# Patient Record
Sex: FEMALE | Race: OTHER | NOT HISPANIC OR LATINO | URBAN - METROPOLITAN AREA
[De-identification: names, ages, dates, MRNs, and addresses within clinical notes are randomized per-mention and may not be internally consistent; named-entity substitution may affect disease eponyms.]

---

## 2023-03-08 ENCOUNTER — EMERGENCY (EMERGENCY)
Facility: HOSPITAL | Age: 38
LOS: 1 days | Discharge: LEFT BEFORE DISCHARGE | End: 2023-03-08
Attending: EMERGENCY MEDICINE
Payer: MEDICAID

## 2023-03-08 VITALS
OXYGEN SATURATION: 98 % | RESPIRATION RATE: 19 BRPM | HEART RATE: 76 BPM | SYSTOLIC BLOOD PRESSURE: 115 MMHG | DIASTOLIC BLOOD PRESSURE: 81 MMHG | TEMPERATURE: 98 F | WEIGHT: 149.91 LBS | HEIGHT: 64 IN

## 2023-03-08 LAB
ALBUMIN SERPL ELPH-MCNC: 3.7 G/DL — SIGNIFICANT CHANGE UP (ref 3.3–5)
ALP SERPL-CCNC: 57 U/L — SIGNIFICANT CHANGE UP (ref 40–120)
ALT FLD-CCNC: 28 U/L — SIGNIFICANT CHANGE UP (ref 10–45)
ANION GAP SERPL CALC-SCNC: 8 MMOL/L — SIGNIFICANT CHANGE UP (ref 5–17)
APPEARANCE UR: CLEAR — SIGNIFICANT CHANGE UP
AST SERPL-CCNC: 22 U/L — SIGNIFICANT CHANGE UP (ref 10–40)
BACTERIA # UR AUTO: NEGATIVE — SIGNIFICANT CHANGE UP
BASE EXCESS BLDV CALC-SCNC: 1.3 MMOL/L — SIGNIFICANT CHANGE UP (ref -2–3)
BASOPHILS # BLD AUTO: 0.03 K/UL — SIGNIFICANT CHANGE UP (ref 0–0.2)
BASOPHILS NFR BLD AUTO: 0.4 % — SIGNIFICANT CHANGE UP (ref 0–2)
BILIRUB SERPL-MCNC: 0.1 MG/DL — LOW (ref 0.2–1.2)
BILIRUB UR-MCNC: NEGATIVE — SIGNIFICANT CHANGE UP
BUN SERPL-MCNC: 11 MG/DL — SIGNIFICANT CHANGE UP (ref 7–23)
CA-I SERPL-SCNC: 1.29 MMOL/L — SIGNIFICANT CHANGE UP (ref 1.15–1.33)
CALCIUM SERPL-MCNC: 9.1 MG/DL — SIGNIFICANT CHANGE UP (ref 8.4–10.5)
CHLORIDE BLDV-SCNC: 107 MMOL/L — SIGNIFICANT CHANGE UP (ref 96–108)
CHLORIDE SERPL-SCNC: 107 MMOL/L — SIGNIFICANT CHANGE UP (ref 96–108)
CO2 BLDV-SCNC: 28 MMOL/L — HIGH (ref 22–26)
CO2 SERPL-SCNC: 24 MMOL/L — SIGNIFICANT CHANGE UP (ref 22–31)
COLOR SPEC: SIGNIFICANT CHANGE UP
CREAT SERPL-MCNC: 0.61 MG/DL — SIGNIFICANT CHANGE UP (ref 0.5–1.3)
DIFF PNL FLD: ABNORMAL
EGFR: 118 ML/MIN/1.73M2 — SIGNIFICANT CHANGE UP
EOSINOPHIL # BLD AUTO: 0.42 K/UL — SIGNIFICANT CHANGE UP (ref 0–0.5)
EOSINOPHIL NFR BLD AUTO: 5.7 % — SIGNIFICANT CHANGE UP (ref 0–6)
EPI CELLS # UR: 2 /HPF — SIGNIFICANT CHANGE UP
GAS PNL BLDV: 139 MMOL/L — SIGNIFICANT CHANGE UP (ref 136–145)
GAS PNL BLDV: SIGNIFICANT CHANGE UP
GAS PNL BLDV: SIGNIFICANT CHANGE UP
GLUCOSE BLDV-MCNC: 102 MG/DL — HIGH (ref 70–99)
GLUCOSE SERPL-MCNC: 106 MG/DL — HIGH (ref 70–99)
GLUCOSE UR QL: NEGATIVE — SIGNIFICANT CHANGE UP
HCG SERPL-ACNC: <2 MIU/ML — SIGNIFICANT CHANGE UP
HCG UR QL: NEGATIVE — SIGNIFICANT CHANGE UP
HCO3 BLDV-SCNC: 27 MMOL/L — SIGNIFICANT CHANGE UP (ref 22–29)
HCT VFR BLD CALC: 36 % — SIGNIFICANT CHANGE UP (ref 34.5–45)
HCT VFR BLDA CALC: 37 % — SIGNIFICANT CHANGE UP (ref 34.5–46.5)
HGB BLD CALC-MCNC: 12.2 G/DL — SIGNIFICANT CHANGE UP (ref 11.7–16.1)
HGB BLD-MCNC: 11.9 G/DL — SIGNIFICANT CHANGE UP (ref 11.5–15.5)
HOROWITZ INDEX BLDV+IHG-RTO: SIGNIFICANT CHANGE UP
HYALINE CASTS # UR AUTO: 1 /LPF — SIGNIFICANT CHANGE UP (ref 0–2)
IMM GRANULOCYTES NFR BLD AUTO: 0.3 % — SIGNIFICANT CHANGE UP (ref 0–0.9)
KETONES UR-MCNC: NEGATIVE — SIGNIFICANT CHANGE UP
LACTATE BLDV-MCNC: 1.2 MMOL/L — SIGNIFICANT CHANGE UP (ref 0.5–2)
LEUKOCYTE ESTERASE UR-ACNC: ABNORMAL
LIDOCAIN IGE QN: 40 U/L — SIGNIFICANT CHANGE UP (ref 7–60)
LYMPHOCYTES # BLD AUTO: 3.01 K/UL — SIGNIFICANT CHANGE UP (ref 1–3.3)
LYMPHOCYTES # BLD AUTO: 40.5 % — SIGNIFICANT CHANGE UP (ref 13–44)
MCHC RBC-ENTMCNC: 28.3 PG — SIGNIFICANT CHANGE UP (ref 27–34)
MCHC RBC-ENTMCNC: 33.1 GM/DL — SIGNIFICANT CHANGE UP (ref 32–36)
MCV RBC AUTO: 85.7 FL — SIGNIFICANT CHANGE UP (ref 80–100)
MONOCYTES # BLD AUTO: 0.5 K/UL — SIGNIFICANT CHANGE UP (ref 0–0.9)
MONOCYTES NFR BLD AUTO: 6.7 % — SIGNIFICANT CHANGE UP (ref 2–14)
NEUTROPHILS # BLD AUTO: 3.45 K/UL — SIGNIFICANT CHANGE UP (ref 1.8–7.4)
NEUTROPHILS NFR BLD AUTO: 46.4 % — SIGNIFICANT CHANGE UP (ref 43–77)
NITRITE UR-MCNC: NEGATIVE — SIGNIFICANT CHANGE UP
NRBC # BLD: 0 /100 WBCS — SIGNIFICANT CHANGE UP (ref 0–0)
PCO2 BLDV: 44 MMHG — HIGH (ref 39–42)
PH BLDV: 7.39 — SIGNIFICANT CHANGE UP (ref 7.32–7.43)
PH UR: 7.5 — SIGNIFICANT CHANGE UP (ref 5–8)
PLATELET # BLD AUTO: 265 K/UL — SIGNIFICANT CHANGE UP (ref 150–400)
PO2 BLDV: 23 MMHG — LOW (ref 25–45)
POTASSIUM BLDV-SCNC: 3.8 MMOL/L — SIGNIFICANT CHANGE UP (ref 3.5–5.1)
POTASSIUM SERPL-MCNC: 3.9 MMOL/L — SIGNIFICANT CHANGE UP (ref 3.5–5.3)
POTASSIUM SERPL-SCNC: 3.9 MMOL/L — SIGNIFICANT CHANGE UP (ref 3.5–5.3)
PROT SERPL-MCNC: 6.6 G/DL — SIGNIFICANT CHANGE UP (ref 6–8.3)
PROT UR-MCNC: NEGATIVE — SIGNIFICANT CHANGE UP
RBC # BLD: 4.2 M/UL — SIGNIFICANT CHANGE UP (ref 3.8–5.2)
RBC # FLD: 13.2 % — SIGNIFICANT CHANGE UP (ref 10.3–14.5)
RBC CASTS # UR COMP ASSIST: 2 /HPF — SIGNIFICANT CHANGE UP (ref 0–4)
SAO2 % BLDV: 31.5 % — LOW (ref 67–88)
SODIUM SERPL-SCNC: 139 MMOL/L — SIGNIFICANT CHANGE UP (ref 135–145)
SP GR SPEC: 1.01 — SIGNIFICANT CHANGE UP (ref 1.01–1.02)
UROBILINOGEN FLD QL: NEGATIVE — SIGNIFICANT CHANGE UP
WBC # BLD: 7.43 K/UL — SIGNIFICANT CHANGE UP (ref 3.8–10.5)
WBC # FLD AUTO: 7.43 K/UL — SIGNIFICANT CHANGE UP (ref 3.8–10.5)
WBC UR QL: 6 /HPF — HIGH (ref 0–5)

## 2023-03-08 PROCEDURE — 99284 EMERGENCY DEPT VISIT MOD MDM: CPT

## 2023-03-08 PROCEDURE — 74177 CT ABD & PELVIS W/CONTRAST: CPT | Mod: 26,MA

## 2023-03-08 RX ORDER — SODIUM CHLORIDE 9 MG/ML
1000 INJECTION, SOLUTION INTRAVENOUS ONCE
Refills: 0 | Status: COMPLETED | OUTPATIENT
Start: 2023-03-08 | End: 2023-03-08

## 2023-03-08 RX ADMIN — SODIUM CHLORIDE 1000 MILLILITER(S): 9 INJECTION, SOLUTION INTRAVENOUS at 23:15

## 2023-03-08 NOTE — ED PROVIDER NOTE - PATIENT PORTAL LINK FT
You can access the FollowMyHealth Patient Portal offered by [facility] by registering at the following website: [email or web address]. By joining [facility]’s FollowMyHealth portal, you will also be able to view your health information using other applications (apps) compatible with our system.

## 2023-03-08 NOTE — ED PROVIDER NOTE - LANGUAGE ASSISTANCE NEEDED
Message  I'm having a good week  Not much has changed in regards to my tube feeds and oral intake  I think I'm kind of maxed out with tube feeds  I tried increasing and it just leaked around my Tube  I'm infusing 900mL daily and haven't needed anymore than that  I'm going to try to wean the IV fluids like we did with my kids  We weaned 100-150 ml per week as tolerated  When we got down to about 400 we started every other day and weaned from there  We will see how it goes  Reviewed patient's lab results- prealbumin dropped to 19 7 but still normal  Phosphorous dropped to 2 7 from 3 4  Emailed patient and provided with list of high phosphorous foods to try increase her level  May need to order K-phos if her levels continue to drop or don't respond to po intake      Active Problems    1  Abdominal discomfort (789 00) (R10 9)   2  Abdominal pain (789 00) (R10 9)   3  Abnormal weight gain (783 1) (R63 5)   4  Acute UTI (599 0) (N39 0)   5  Allergic contact dermatitis due to other agents (692 89) (L23 89)   6  Cervical cancer screening (V76 2) (Z12 4)   7  Chronic venous embolism and thrombosis of deep vessels of distal lower extremity   (453 52) (I82 5Z9)   8  Constipation (564 00) (K59 00)   9  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)   10  Dysphagia (787 20) (R13 10)   11  Edema (782 3) (R60 9)   12  Gastroesophageal reflux disease (530 81) (K21 9)   13  History of Nissen fundoplication (P97 45) (A13 48)   14  Hypertension (401 9) (I10)   15  Iron deficiency (280 9) (E61 1)   16  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)   17  Migraine headache (346 90) (G43 909)   18  Muscle weakness (generalized) (728 87) (M62 81)   19  Nausea (787 02) (R11 0)   20  Nausea with vomiting (787 01) (R11 2)   21  Need for diphtheria-tetanus-pertussis (Tdap) vaccine (V06 1) (Z23)   22  Obesity (278 00) (E66 9)   23  Oropharyngeal dysphagia (787 22) (R13 12)   24   Polycystic ovarian syndrome (256  4) (E28 2)   25  Postgastrectomy malabsorption (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) (K91 2,Z90 3)   26  Pre-op evaluation (V72 84) (Z01 818)   27  Pre-operative cardiovascular examination (V72 81) (Z01 810)   28  Retinitis pigmentosa (362 74) (H35 52)   29  Status post gastric bypass for obesity (V45 86) (Z98 84)   30  Stroke Syndrome (436)   31  Type B influenza (487 1) (J10 1)   32  Urination pain (788 1) (R30 9)    Current Meds   1  Amoxicillin-Pot Clavulanate 400-57 MG/5ML Oral Suspension Reconstituted; TAKE 5 ML   Every 6 hours As Directed TDD:20;   Therapy: 56RGO4846 to (Evaluate:17Aug2016)  Requested for: 44LQX0665; Last   Rx:19May2016 Ordered   2  B Complex CAPS; Therapy: (Recorded:71Qcj6845) to Recorded   3  Culturelle CAPS; Therapy: (Recorded:08Qlf6487) to Recorded   4  MiraLax Oral Powder (Polyethylene Glycol 3350); Therapy: (Recorded:75Pug6541) to Recorded   5  Multivitamins TABS; TAKE 1 TABLET DAILY; Therapy: (Recorded:08Vqk8988) to Recorded   6  Ondansetron 4 MG Oral Tablet Dispersible (Zofran ODT); Take one every 4-6 hours prn   for nausea; Therapy: 35DBA2156 to (Last 72 954 091)  Requested for: 23Nov2015 Ordered   7  Pantoprazole Sodium 40 MG Oral Tablet Delayed Release (Protonix); TAKE 1 TABLET   TWICE DAILY 30 MINUTES BEFORE BREAKFAST AND DINNER; Therapy: 17FER1435 to (Korey Riding)  Requested for: 24TYA4493; Last   Rx:10Mar2016 Ordered   8  Stool Softener TABS; Therapy: (Recorded:54Dyt3811) to Recorded    Allergies    1  Guaifenesin & Derivatives   2   Sulfa Drugs    Signatures   Electronically signed by : ANGELA Amato; Aug 23 2016  2:48PM EST                       (Author) No-Patient/Caregiver offered and refused free interpretation services.

## 2023-03-08 NOTE — ED PROVIDER NOTE - NS ED ROS FT
CONST: no fevers, no chills  ENT: no sore throat  CV: no chest pain, no leg swelling  RESP: no shortness of breath, no cough  ABD: +abdominal pain, no nausea, no vomiting, no diarrhea  : no current dysuria, no current flank pain, no hematuria  MSK: no back pain, no extremity pain  SKIN:  no rash

## 2023-03-08 NOTE — ED PROVIDER NOTE - PHYSICAL EXAMINATION
Physical Exam:  Gen: awake alert   HEENT: normal conjunctiva, oral mucosa moist  Lung: CTAB, no respiratory distress, no wheezes/rhonchi/rales B/L, speaking in full sentences  CV: RRR  Abd: soft, LLQ/suprapubic TTP, ND, no guarding, no rigidity, no rebound tenderness, Mild L CVA tenderness   MSK: no visible deformities  Skin: Warm, well perfused, no rash, no leg swelling  ~Robert Pittman MD (PGY-3)

## 2023-03-08 NOTE — ED PROVIDER NOTE - OBJECTIVE STATEMENT
British speaking but  at bedside translatinF no PMH p/w 1 week of LLQ/suprapubic pain that started 1 week ago. Pt endorses nausea last week w/o vomiting but this has since resolved. Also had dysuria last week that now resolved. Has slight L flank pain. No f/c, current urinary symptoms, cp/sob, leg swelling, vag bleeding/discharge. Had intercourse for first time last week

## 2023-03-08 NOTE — ED PROVIDER NOTE - ATTENDING CONTRIBUTION TO CARE
Attending MD Hall: I personally have seen and examined this patient.  Resident note reviewed and agree on plan of care and except where noted.  See below for details.     Seen in [place] [facility], accompanied by , declined , requested  be used for [place]    37F with no reported contributory PMH/PSH/Meds, no known drug allergies presents to the ED with lower abdominal and lower back pain.  Reports that about a week ago developed LLQ/suprapubic pain.  Reports associated nausea, vomiting last week, none last few days.  Reports last week had transient dysuria which resolved spontaneously.  Denies genital lesions, vaginal bleeding.  Reports has noted a thick white discharge, reports change from baseline.  Reports to Dr. Pittman that intercourse last week was first intercourse.  Reports to this MD no history of STIs.  Denies Fhx.  Reports LMP two weeks ago.    Exam:   General: NAD  HENT: head NCAT, airway patent  Eyes: anicteric, no conjunctival injection   Lungs: lungs CTAB with good inspiratory effort, no wheezing, no rhonchi, no rales  Cardiac: +S1S2, no obvious m/r/g  GI: abdomen soft with +BS, +lower abdominal midline tenderness at suprapubic area, ND  : no CVAT, refuses pelvic  MSK: FROM at neck, no calf tenderness, swelling, erythema or warmth   Neuro: moving all extremities spontaneously, nonfocal  Psych: normal mood and affect     A/P: 37F with

## 2023-03-08 NOTE — ED PROVIDER NOTE - PROGRESS NOTE DETAILS
Zain PEREZ (PGY-3)  pt endorsed to dr. sue that she has white vag discharge. however when attempting to do pelvic exam, pt refusing at this time. informed pt to let me know if she changes her mind as this exam may elucidate cuase of her pain Zain PEREZ (PGY-3)  CT not yet resulted but pt would like to leave. will treat as L sided pyelo. pt given cefpodoxime and rest sent to pharmacy. The patient is leaving against medical advice. I have spoken with the patient and discussed the results and plans, but the patient continue to conveys unwillingness to stay and be treated. The patient understands the risk of leaving without further evaluation and workup. The risks includes possible worsening of symptoms, chronic disabilities, and possible death. The patient is told and understands that he/she is welcomed to come back to the ER at anytime for worsening symptoms.

## 2023-03-08 NOTE — ED PROVIDER NOTE - NSFOLLOWUPINSTRUCTIONS_ED_ALL_ED_FT
Take Cefpodoxime twice a day for 14 days    Follow up with your Gynecologist next week as previously scheduled    Please take over the counter pain medications such as Motrin (600mg every 6 hours) and Tylenol (650mg every 4 hours) for pain    Pyelonephritis    Pyelonephritis is a kidney infection. In most cases, the infection clears up with treatment and does not cause further problems. More severe infections or chronic infections can sometimes spread to the bloodstream or lead to other problems with the kidneys. Symptoms include frequent or painful urination, abdominal pain, back pain, flank pain, fever/chills, nausea, or vomiting. If you were prescribed an antibiotic medicine, take it as told by your health care provider. Do not stop taking the antibiotic even if you start to feel better.    SEEK IMMEDIATE MEDICAL CARE IF YOU HAVE ANY OF THE FOLLOWING SYMPTOMS: inability to hold down antibiotics or fluids, worsening pain, dizziness/lightheadedness, or change in mental status.

## 2023-03-08 NOTE — ED PROVIDER NOTE - CLINICAL SUMMARY MEDICAL DECISION MAKING FREE TEXT BOX
37F no PMH p/w 1 week of LLQ/suprapubic pain that started 1 week ago associated with nausea and slight L flank pain. VS and exam as above  Will need to eval for diveriticulitis vs. kidney stone/pyelo. Lower concern for hepatobiliary etiology as pt is NT across RUQ/epigastric region  Plan: abdominal labs, ua/ucx, CT A/P, reassess symptoms

## 2023-03-09 VITALS — SYSTOLIC BLOOD PRESSURE: 106 MMHG | DIASTOLIC BLOOD PRESSURE: 68 MMHG

## 2023-03-09 LAB
C TRACH RRNA SPEC QL NAA+PROBE: SIGNIFICANT CHANGE UP
CULTURE RESULTS: SIGNIFICANT CHANGE UP
N GONORRHOEA RRNA SPEC QL NAA+PROBE: SIGNIFICANT CHANGE UP
SPECIMEN SOURCE: SIGNIFICANT CHANGE UP

## 2023-03-09 PROCEDURE — 82330 ASSAY OF CALCIUM: CPT

## 2023-03-09 PROCEDURE — 83690 ASSAY OF LIPASE: CPT

## 2023-03-09 PROCEDURE — 81001 URINALYSIS AUTO W/SCOPE: CPT

## 2023-03-09 PROCEDURE — 87086 URINE CULTURE/COLONY COUNT: CPT

## 2023-03-09 PROCEDURE — 85025 COMPLETE CBC W/AUTO DIFF WBC: CPT

## 2023-03-09 PROCEDURE — 83605 ASSAY OF LACTIC ACID: CPT

## 2023-03-09 PROCEDURE — 82947 ASSAY GLUCOSE BLOOD QUANT: CPT

## 2023-03-09 PROCEDURE — 82803 BLOOD GASES ANY COMBINATION: CPT

## 2023-03-09 PROCEDURE — 84702 CHORIONIC GONADOTROPIN TEST: CPT

## 2023-03-09 PROCEDURE — 87591 N.GONORRHOEAE DNA AMP PROB: CPT

## 2023-03-09 PROCEDURE — 84295 ASSAY OF SERUM SODIUM: CPT

## 2023-03-09 PROCEDURE — 81025 URINE PREGNANCY TEST: CPT

## 2023-03-09 PROCEDURE — 99284 EMERGENCY DEPT VISIT MOD MDM: CPT | Mod: 25

## 2023-03-09 PROCEDURE — 80053 COMPREHEN METABOLIC PANEL: CPT

## 2023-03-09 PROCEDURE — 96360 HYDRATION IV INFUSION INIT: CPT | Mod: XU

## 2023-03-09 PROCEDURE — 84132 ASSAY OF SERUM POTASSIUM: CPT

## 2023-03-09 PROCEDURE — 87491 CHLMYD TRACH DNA AMP PROBE: CPT

## 2023-03-09 PROCEDURE — 85018 HEMOGLOBIN: CPT

## 2023-03-09 PROCEDURE — 82435 ASSAY OF BLOOD CHLORIDE: CPT

## 2023-03-09 PROCEDURE — 85014 HEMATOCRIT: CPT

## 2023-03-09 PROCEDURE — 74177 CT ABD & PELVIS W/CONTRAST: CPT | Mod: MA

## 2023-03-09 RX ORDER — CEFPODOXIME PROXETIL 100 MG
100 TABLET ORAL ONCE
Refills: 0 | Status: DISCONTINUED | OUTPATIENT
Start: 2023-03-09 | End: 2023-03-09

## 2023-03-09 RX ORDER — CEFPODOXIME PROXETIL 100 MG
1 TABLET ORAL
Qty: 14 | Refills: 0
Start: 2023-03-09 | End: 2023-03-15

## 2023-03-09 RX ORDER — CEFPODOXIME PROXETIL 100 MG
1 TABLET ORAL
Qty: 28 | Refills: 0
Start: 2023-03-09 | End: 2023-03-22

## 2023-03-09 RX ORDER — CEFPODOXIME PROXETIL 100 MG
200 TABLET ORAL ONCE
Refills: 0 | Status: COMPLETED | OUTPATIENT
Start: 2023-03-09 | End: 2023-03-09

## 2023-03-09 RX ADMIN — Medication 200 MILLIGRAM(S): at 01:36

## 2023-03-09 RX ADMIN — SODIUM CHLORIDE 1000 MILLILITER(S): 9 INJECTION, SOLUTION INTRAVENOUS at 00:10

## 2023-03-09 NOTE — ED ADULT NURSE NOTE - OBJECTIVE STATEMENT
38 y/o female A&OX4, came to the ED with complaints of dysuria, lower back pains and abdominal pains x one week, white vaginal discharge, nausea. denies vomiting and diarrhea, headache, SOB, CP, fevers, chills.

## 2024-03-13 PROBLEM — Z78.9 OTHER SPECIFIED HEALTH STATUS: Chronic | Status: ACTIVE | Noted: 2023-03-08

## 2024-03-15 PROBLEM — Z00.00 ENCOUNTER FOR PREVENTIVE HEALTH EXAMINATION: Status: ACTIVE | Noted: 2024-03-15

## 2024-03-19 ENCOUNTER — APPOINTMENT (OUTPATIENT)
Dept: OBGYN | Facility: CLINIC | Age: 39
End: 2024-03-19